# Patient Record
Sex: FEMALE
[De-identification: names, ages, dates, MRNs, and addresses within clinical notes are randomized per-mention and may not be internally consistent; named-entity substitution may affect disease eponyms.]

---

## 2018-10-16 ENCOUNTER — HOSPITAL ENCOUNTER (EMERGENCY)
Dept: HOSPITAL 31 - C.ER | Age: 53
Discharge: HOME | End: 2018-10-16
Payer: COMMERCIAL

## 2018-10-16 VITALS
TEMPERATURE: 97.6 F | DIASTOLIC BLOOD PRESSURE: 79 MMHG | HEART RATE: 83 BPM | RESPIRATION RATE: 19 BRPM | SYSTOLIC BLOOD PRESSURE: 116 MMHG

## 2018-10-16 VITALS — OXYGEN SATURATION: 99 %

## 2018-10-16 VITALS — BODY MASS INDEX: 27.1 KG/M2

## 2018-10-16 DIAGNOSIS — B34.9: Primary | ICD-10-CM

## 2018-10-16 DIAGNOSIS — J98.01: ICD-10-CM

## 2018-10-16 NOTE — RAD
HISTORY:

 cough 



COMPARISON:

Chest x-ray performed 2/16/16 



TECHNIQUE:

Chest PA and lateral



FINDINGS:

Examination limited by habitus.



LUNGS:

Subtle left lower lobe atelectasis or infiltrate.



Please note that chest x-ray has limited sensitivity for the 

detection of pulmonary masses.



PLEURA:

No significant pleural effusion identified. No definite pneumothorax .



CARDIOVASCULAR:

Heart size appears within normal limits.  Atherosclerotic 

calcification of the aortic knob.



OSSEOUS STRUCTURES:

Osseous demineralization.  Degenerative changes.  Mild kyphosis.



VISUALIZED UPPER ABDOMEN:

Unremarkable.



OTHER FINDINGS:

None.



IMPRESSION:

Subtle hazy left lower lobe atelectasis or infiltrate.

## 2018-10-16 NOTE — C.PDOC
History Of Present Illness


53-year-old female, PMHx includes asthma, presents to the emergency department 

with complaints of runny nose, fevers, chills, body aches, non-productive cough,

abdominal cramping, non-bloody/watery diarrhea, shortness of breath and 

wheezing. Patient denies any nausea/vomiting, back pain, dizziness, dysuria, 

hematuria, headache, dizziness, chest pain, or any other associated symptoms. no

other complaints at this time.


Time Seen by Provider: 10/16/18 12:56


Chief Complaint (Nursing): Chest Pain


History Per: Patient


History/Exam Limitations: no limitations


Current Symptoms Are (Timing): Still Present





Past Medical History


Reviewed: Historical Data, Nursing Documentation, Vital Signs


Vital Signs: 





                                Last Vital Signs











Temp  97.9 F   10/16/18 12:34


 


Pulse  92 H  10/16/18 12:34


 


Resp  22   10/16/18 12:34


 


BP  148/87   10/16/18 12:34


 


Pulse Ox  99   10/16/18 12:34














- Medical History


PMH: Asthma, Osteoporosis


Family History: States: No Known Family Hx





- Social History


Hx Alcohol Use: No


Hx Substance Use: No





- Immunization History


Hx Tetanus Toxoid Vaccination: Yes


Hx Influenza Vaccination: No


Hx Pneumococcal Vaccination: Yes





Review Of Systems


Constitutional: Positive for: Fever, Chills, Malaise


ENT: Negative for: Throat Pain


Cardiovascular: Negative for: Chest Pain, Palpitations, Edema, Light Headedness


Respiratory: Positive for: Shortness of Breath, Wheezing


Gastrointestinal: Positive for: Abdominal Pain, Diarrhea


Genitourinary: Negative for: Dysuria


Musculoskeletal: Negative for: Back Pain





Physical Exam





- Physical Exam


Appears: Non-toxic, No Acute Distress, Other (Speaking in full sentences)


Skin: Warm, Dry, No Rash


Head: Atraumatic, Normacephalic


Eye(s): bilateral: Normal Inspection


Nose: Normal


Oral Mucosa: Moist


Lips: Normal Appearing


Neck: Normal ROM


Chest: Symmetrical


Cardiovascular: Rhythm Regular, No Murmur


Respiratory: No Decreased Breath Sounds, No Accessory Muscle Use, Wheezing 

(expiratory)


Gastrointestinal/Abdominal: Soft, No Tenderness


Extremity: Normal ROM, No Deformity


Neurological/Psych: Oriented x3, Normal Speech





ED Course And Treatment


ECG: Interpreted By Me, Viewed By Me


ECG Rhythm: Sinus Rhythm


ECG Interpretation: No Acute Changes


Rate From EC


O2 Sat by Pulse Oximetry: 99


Pulse Ox Interpretation: Normal (RA)


Progress Note: Bloodwork, EKG, Chest X-Ray and influenza AB ordered and 

reviewed. patient treated with duoneb, Tylenol and Prednisone.





Disposition


Counseled Patient/Family Regarding: Studies Performed, Diagnosis, Need For 

Followup, Rx Given





- Disposition


Referrals: 


Alda Mcfarland MD [Non-Staff] - 


Disposition: HOME/ ROUTINE


Disposition Time: 14:15


Condition: STABLE


Additional Instructions: 


FOLLOW UP WITH YOUR DOCTOR IN 1-2 DAYS





USE MEDICATIONS AS DIRECTED





DRINK PLENTY OF FLUIDS





RETURN TO ER IF SYMPTOMS WORSEN


Prescriptions: 


Albuterol HFA [Ventolin HFA 90 mcg/actuation (8 g)] 0.09 mg IH Q4 PRN #1 puff


 PRN Reason: Wheezing


Benzonatate [Tessalon Perles] 100 mg PO BID PRN #15 sgl


 PRN Reason: Cough


Naproxen 375 mg PO BID PRN #20 tablet


 PRN Reason: pain


predniSONE [predniSONE Tab] 40 mg PO DAILY #6 tab


Instructions:  Viral Upper Respiratory Infection, Adult (DC), Wheezing


Forms:  [employer] (English), Work Excuse


Print Language: ENGLISH





- Clinical Impression


Clinical Impression: 


 Viral syndrome, Bronchospasm








- Scribe Statement


The provider has reviewed the documentation as recorded by the Scribe (Annalisa Reardon)


Provider Attestation: 








All medical record entries made by the Scribe were at my direction and 

personally dictated by me. I have reviewed the chart and agree that the record 

accurately reflects my personal performance of the history, physical exam, 

medical decision making, and the department course for this patient. I have also

personally directed, reviewed, and agree with the discharge instructions and 

disposition.